# Patient Record
Sex: FEMALE | Race: WHITE | Employment: OTHER | URBAN - METROPOLITAN AREA
[De-identification: names, ages, dates, MRNs, and addresses within clinical notes are randomized per-mention and may not be internally consistent; named-entity substitution may affect disease eponyms.]

---

## 2018-08-02 ENCOUNTER — TELEPHONE (OUTPATIENT)
Dept: FAMILY MEDICINE CLINIC | Age: 83
End: 2018-08-02

## 2018-08-02 NOTE — TELEPHONE ENCOUNTER
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 States she saw him last July at [facility].   She will have her records sent for Dr Dania Mendoza to review.

## 2018-08-03 NOTE — TELEPHONE ENCOUNTER
Called and spoke with pt, and she has been advised and states understanding that Dr. Trixie Riedel does not treat her condition. Pt states her Rheumatologist would treat condition but he has question about medication and dosage. Pt advised to have Rheumatologist contact provider if needed, however, Dr. Trixie Riedel would be unable to offer medical advise without access to her records. . Pt states understanding.

## 2018-08-17 ENCOUNTER — TELEPHONE (OUTPATIENT)
Dept: FAMILY MEDICINE CLINIC | Age: 83
End: 2018-08-17

## 2018-08-17 NOTE — TELEPHONE ENCOUNTER
----- Message from Solomon Paris sent at 8/17/2018 10:01 AM EDT -----  Regarding: Dr. Kale Tay requesting a call back in regards to making an appointment. Pt best contact number is [phone].